# Patient Record
Sex: FEMALE | Race: WHITE
[De-identification: names, ages, dates, MRNs, and addresses within clinical notes are randomized per-mention and may not be internally consistent; named-entity substitution may affect disease eponyms.]

---

## 2019-10-02 ENCOUNTER — HOSPITAL ENCOUNTER (OUTPATIENT)
Dept: HOSPITAL 47 - LABWHC1 | Age: 49
Discharge: HOME | End: 2019-10-02
Attending: FAMILY MEDICINE
Payer: MEDICAID

## 2019-10-02 DIAGNOSIS — I10: Primary | ICD-10-CM

## 2019-10-02 DIAGNOSIS — J45.909: ICD-10-CM

## 2019-10-02 DIAGNOSIS — K58.9: ICD-10-CM

## 2019-10-02 DIAGNOSIS — I51.7: ICD-10-CM

## 2019-10-02 LAB
ALBUMIN SERPL-MCNC: 4.5 G/DL (ref 3.8–4.9)
ALBUMIN/GLOB SERPL: 2.37 G/DL (ref 1.6–3.17)
ALP SERPL-CCNC: 63 U/L (ref 41–126)
ALT SERPL-CCNC: 36 U/L (ref 8–44)
ANION GAP SERPL CALC-SCNC: 9.5 MMOL/L (ref 4–12)
AST SERPL-CCNC: 22 U/L (ref 13–35)
BASOPHILS # BLD AUTO: 0 K/UL (ref 0–0.2)
BASOPHILS NFR BLD AUTO: 1 %
BUN SERPL-SCNC: 13 MG/DL (ref 9–27)
BUN/CREAT SERPL: 18.57 RATIO (ref 12–20)
CALCIUM SPEC-MCNC: 9.3 MG/DL (ref 8.7–10.3)
CHLORIDE SERPL-SCNC: 103 MMOL/L (ref 96–109)
CHOLEST SERPL-MCNC: 191 MG/DL (ref 0–200)
CO2 SERPL-SCNC: 27.5 MMOL/L (ref 21.6–31.8)
EOSINOPHIL # BLD AUTO: 0.1 K/UL (ref 0–0.7)
EOSINOPHIL NFR BLD AUTO: 1 %
ERYTHROCYTE [DISTWIDTH] IN BLOOD BY AUTOMATED COUNT: 4.31 M/UL (ref 3.8–5.4)
ERYTHROCYTE [DISTWIDTH] IN BLOOD: 12.7 % (ref 11.5–15.5)
GLOBULIN SER CALC-MCNC: 1.9 G/DL (ref 1.6–3.3)
GLUCOSE SERPL-MCNC: 119 MG/DL (ref 70–110)
HCT VFR BLD AUTO: 38.8 % (ref 34–46)
HDLC SERPL-MCNC: 49 MG/DL (ref 40–60)
HGB BLD-MCNC: 13 GM/DL (ref 11.4–16)
LDLC SERPL CALC-MCNC: 115 MG/DL (ref 0–131)
LYMPHOCYTES # SPEC AUTO: 1.9 K/UL (ref 1–4.8)
LYMPHOCYTES NFR SPEC AUTO: 23 %
MCH RBC QN AUTO: 30.1 PG (ref 25–35)
MCHC RBC AUTO-ENTMCNC: 33.4 G/DL (ref 31–37)
MCV RBC AUTO: 89.9 FL (ref 80–100)
MONOCYTES # BLD AUTO: 0.3 K/UL (ref 0–1)
MONOCYTES NFR BLD AUTO: 4 %
NEUTROPHILS # BLD AUTO: 5.8 K/UL (ref 1.3–7.7)
NEUTROPHILS NFR BLD AUTO: 70 %
PLATELET # BLD AUTO: 294 K/UL (ref 150–450)
POTASSIUM SERPL-SCNC: 4 MMOL/L (ref 3.5–5.5)
PROT SERPL-MCNC: 6.4 G/DL (ref 6.2–8.2)
SODIUM SERPL-SCNC: 140 MMOL/L (ref 135–145)
T4 FREE SERPL-MCNC: 1 NG/DL (ref 0.8–1.8)
TRIGL SERPL-MCNC: 135 MG/DL (ref 0–149)
VLDLC SERPL CALC-MCNC: 27 MG/DL (ref 5–40)
WBC # BLD AUTO: 8.3 K/UL (ref 3.8–10.6)

## 2019-10-02 PROCEDURE — 85025 COMPLETE CBC W/AUTO DIFF WBC: CPT

## 2019-10-02 PROCEDURE — 84439 ASSAY OF FREE THYROXINE: CPT

## 2019-10-02 PROCEDURE — 80061 LIPID PANEL: CPT

## 2019-10-02 PROCEDURE — 80053 COMPREHEN METABOLIC PANEL: CPT

## 2019-10-02 PROCEDURE — 36415 COLL VENOUS BLD VENIPUNCTURE: CPT

## 2019-10-02 PROCEDURE — 84443 ASSAY THYROID STIM HORMONE: CPT

## 2019-11-20 ENCOUNTER — HOSPITAL ENCOUNTER (OUTPATIENT)
Dept: HOSPITAL 47 - RADMAMWWP | Age: 49
Discharge: HOME | End: 2019-11-20
Attending: FAMILY MEDICINE
Payer: MEDICAID

## 2019-11-20 DIAGNOSIS — Z12.31: Primary | ICD-10-CM

## 2019-11-20 PROCEDURE — 77067 SCR MAMMO BI INCL CAD: CPT

## 2019-11-20 PROCEDURE — 77063 BREAST TOMOSYNTHESIS BI: CPT

## 2020-07-29 ENCOUNTER — HOSPITAL ENCOUNTER (OUTPATIENT)
Dept: HOSPITAL 47 - RADXRMAIN | Age: 50
Discharge: HOME | End: 2020-07-29
Attending: FAMILY MEDICINE
Payer: MEDICAID

## 2020-07-29 DIAGNOSIS — M25.562: Primary | ICD-10-CM

## 2020-09-02 ENCOUNTER — HOSPITAL ENCOUNTER (OUTPATIENT)
Dept: HOSPITAL 47 - RADMRIMAIN | Age: 50
Discharge: HOME | End: 2020-09-02
Attending: ORTHOPAEDIC SURGERY
Payer: MEDICAID

## 2020-09-02 DIAGNOSIS — M17.12: ICD-10-CM

## 2020-09-02 DIAGNOSIS — S83.242A: Primary | ICD-10-CM

## 2020-09-02 NOTE — MR
EXAMINATION TYPE: MR knee LT wo con

 

DATE OF EXAM: 9/2/2020

 

COMPARISON: Radiograph 7/29/2020

 

HISTORY: 49-year-old female M25.562, left knee pain

 

TECHNIQUE: Multiplanar, multisequence imaging of the left knee is performed without IV contrast.

 

FINDINGS:

The ACL and PCL appear intact.

 

Mild edematous change on either side of the intact MCL fibers.

 

Some increased signal and mild edema overlying the femoral attachment of the LCL proper. There is als
o mild localized edema along the deep popliteus muscle fibers. LCL complex otherwise appears intact.

 

Small inner margin radial tear posterior horn medial meniscus, sagittal image 22 and coronal image 24
. Mild superficial cartilage loss along the weightbearing aspect of the medial compartment.

 

Some degenerative signal is seen throughout the lateral meniscus without discrete tear. Overall later
al compartment articular cartilage volume is maintained.

 

Moderate irregular cartilage loss throughout the patellofemoral compartment. There is a shallow troch
lear groove with slight lateral patellar tilt and bulky marginal spurring. Mildly increased TT-TG dis
tance of 2.3 cm.

 

Extensor mechanism appears intact. Small knee joint effusion. No sizable Baker's cyst.

 

Small 1.3 cm ganglion cyst at the origin of the medial head gastrocnemius.

 

Normal popliteal artery anatomy and muscle bulk. No suspicious bone marrow replacement. Patchy red ma
rrow is present and can be seen in the setting of anemia, obesity, smoking, chronic disease.

 

 

IMPRESSION: 

 

1. Correlate for low-grade MCL and proximal LCL proper sprains.

2. Mild strain of the popliteus muscle.

3. Small inner margin radial tear posterior horn medial meniscus. Mild overall medial compartment ost
eoarthrosis.

4. Moderate patellofemoral compartment osteoarthrosis. Suspect underlying patellar tracking disorder 
given increased TT-TG distance (2.3 cm) and trochlear dysplasia (shallow superior trochlear groove).

## 2020-12-01 ENCOUNTER — HOSPITAL ENCOUNTER (OUTPATIENT)
Dept: HOSPITAL 47 - RADUSWWP | Age: 50
Discharge: HOME | End: 2020-12-01
Attending: FAMILY MEDICINE
Payer: MEDICAID

## 2020-12-01 DIAGNOSIS — N93.8: Primary | ICD-10-CM

## 2020-12-01 PROCEDURE — 76830 TRANSVAGINAL US NON-OB: CPT

## 2020-12-02 NOTE — US
EXAMINATION TYPE: US transvaginal

 

DATE OF EXAM: 12/1/2020

 

COMPARISON: Radius exam 8/31/2011

 

CLINICAL HISTORY: N93.8 DUB.

 

TECHNIQUE:  Transvaginal (TV).  

 

Date of LMP:  11/02/20

 

EXAM MEASUREMENTS:

 

Uterus:  11.0 x 8.3 x 9.8 cm

Endometrial Stripe: 1.8 cm

Right Ovary:  not visualized 

Left Ovary:  not visualized

 

Morbidly obese patient, technically difficult study. 

 

1. Uterus:  Anteverted, large fibroid uterus, fibroid measuring 7.0 x 7.8 x  8.2cm

2. Endometrium:  thickened

3. Right Ovary:  Obscured by overlying bowel gas/obesity

4. Left Ovary: Obscured by overlying bowel gas/obesity

5. Bilateral Adnexa:  wnl

6. Posterior cul-de-sac: wnl

 

 

 

IMPRESSION: Fibroid uterus suspected and could be confirmed with pelvic MRI, possible endometrial str
ipe thickening as noted on prior exam although exam is somewhat technically limited

## 2021-03-29 ENCOUNTER — HOSPITAL ENCOUNTER (OUTPATIENT)
Dept: HOSPITAL 47 - RADUSWWP | Age: 51
Discharge: HOME | End: 2021-03-29
Attending: OBSTETRICS & GYNECOLOGY
Payer: MEDICAID

## 2021-03-29 DIAGNOSIS — D25.9: Primary | ICD-10-CM

## 2021-03-29 DIAGNOSIS — N85.4: ICD-10-CM

## 2021-03-29 PROCEDURE — 76856 US EXAM PELVIC COMPLETE: CPT

## 2021-03-29 NOTE — US
EXAMINATION TYPE: US pelvic complete

 

DATE OF EXAM: 3/29/2021

 

COMPARISON: NONE

 

CLINICAL HISTORY: 50-year-old female D25.9 known uterine leiomyoma. Assess measurement of fibroid. Pa
tient with extreme cramping, previous ablation, no cycles.

 

TECHNIQUE:  TA.  Transabdominal sonographic images of the pelvis were acquired.  

 

Date of LMP:  ablation

 

FINDINGS:

 

EXAM MEASUREMENTS:

 

Uterus:  12.8 x 8.4 x 7.4 cm

Endometrial Stripe: Very difficult to delineate. The sonographer estimates the stripe at 7 mm on one 
image.

Right Ovary:  2.2 x 2.3 x 2.1 cm

Left Ovary:  not seen 

 

 

 

1. Uterus:  Retroverted . A fundal fibroid is noted measuring 7.5 x 6.2 x 5.9cm (versus 8.2 x 7.8 x 7
.0 cm on 12/1/2020).

2. Endometrium:  Very difficult to delineate likely due to patient's reported endometrial ablation.

3. Right Ovary:  wnl

4. Left Ovary:  not seen due to bowel gas

5. Bilateral Adnexa:  wnl

6. Posterior cul-de-sac:  wnl

 

 

 

IMPRESSION: 

1. Retroverted uterus. The endometrial stripe is very difficult to delineate, likely due to patient's
 reported endometrial ablation.

2. A fundal fibroid is estimated at 7.5 cm (versus 8.2 cm on 12/1/2020).

3. Unable to visualize the left ovary.

## 2022-08-30 ENCOUNTER — HOSPITAL ENCOUNTER (OUTPATIENT)
Dept: HOSPITAL 47 - RADMRIMAIN | Age: 52
Discharge: HOME | End: 2022-08-30
Attending: ORTHOPAEDIC SURGERY
Payer: MEDICAID
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
## 2022-08-30 DIAGNOSIS — S86.012A: Primary | ICD-10-CM

## 2022-08-30 DIAGNOSIS — R60.0: ICD-10-CM

## 2024-07-02 NOTE — MR
EXAMINATION TYPE: MR ankle LT wo con

 

DATE OF EXAM: 8/30/2022

 

COMPARISON: None

 

HISTORY: Pain medial left ankle, left achilles tendon tear

 

Multiplanar multi echo imaging of the right ankle performed with no contrast.

 

There is small ankle joint effusion. Ankle mortise is anatomic. Joint spaces are fairly normal. There
 is mild Achilles and plantar calcaneal spurring. The plantar fascia appears intact. Achilles tendon 
is intact. The medial and lateral flexor tendons are intact. No evidence of a soft tissue mass. No fo
ifeoma bone destruction. No definite evidence of a tear of the collateral ligaments. There is minimal cruz
bcutaneous edema over the lateral malleolus.

 

IMPRESSION:

Mild lateral subcutaneous edema. Slight increased joint fluid could be some mild synovitis. No fractu
re. No evidence of Achilles tendon tear calcaneal spurring. Initial Post Discharge Follow Up   Discharge Date: 6/30/24  Contact Date: 7/2/2024    Consent Verification:  Assessment Completed With: Patient  HIPAA Verified?  Yes    Discharge Dx:   Sudden onset left facial and left arm heaviness/numbness of unclear etiology     General:   How have you been since your discharge from the hospital? Pt reports she is feeling better. Pt stated the symptoms have resolved. Pt denies chest pain, shortness of breath, dizziness, weakness, bilateral LE swelling, vision changes, confusion, n/v/d, slurred speech, numbness/tingling and pain. Pt lives in [place], she is back home now. Pt is eating and drinking well.   Do you have any pain since discharge?  No  How well was your pain managed while in the hospital?   On a scale of 1-5   1- Very Poor and 5- Very well   Very Well  When you were leaving the hospital were your discharge instructions reviewed with you? Yes  How well were your discharge instructions explained to you?   On a scale of 1-5   1- Very Poor and 5- Very well   Very Well  Do you have any questions about your discharge instructions?  No  Before leaving the hospital was your diagnoses explained to you? Yes  Do you have any questions about your diagnoses? No  Are you able to perform normal daily activities of living as you have prior to your hospital stay (dressing, bathing, ambulating to the bathroom, etc)? yes  (NCM) Was patient given a different diet per AVS? no      Medications: Reviewed medication list with the patient. Medications are up to date.   Current Outpatient Medications   Medication Sig Dispense Refill    aspirin 81 MG Oral Chew Tab Chew 1 tablet (81 mg total) by mouth daily. 30 tablet 0    atorvastatin 20 MG Oral Tab Take 1 tablet (20 mg total) by mouth nightly. 30 tablet 0    Etonogestrel-Ethinyl Estradiol (HALOETTE) 0.12-0.015 MG/24HR Vaginal Ring Place 1 Ring vaginally Every 3 (three) weeks.       Were there any changes to your current medication(s) noted on the  AVS? Yes  If so, were these medication changes discussed with you prior to leaving the hospital? Yes  If a new medication was prescribed:    Was the new medication's purpose & side effects reviewed? Yes  Do you have any questions about your new medication? No  Did you  your discharge medications when you left the hospital? Yes  Let's go over your medications together to make sure we are not missing anything. Medications Reviewed  Are there any reasons that keep you from taking your medication as prescribed? No  Are you having any concerns with constipation? No      Discharge medications reviewed/discussed/and reconciled against outpatient medications with patient.  Any changes or updates to medications sent to PCP.  Patient Acknowledged     Referrals/orders at D/C:  Referrals/orders placed at D/C? no    DME ordered at D/C? No      Discharge orders, AVS reviewed and discussed with patient. Any changes or updates to orders sent to PCP.  Patient Acknowledged        Transportation Needs: No Transportation Needs (7/2/2024)    Transportation Needs     Lack of Transportation: No     Car Seat: Not on file       Financial Resource Strain: Low Risk  (7/2/2024)    Financial Resource Strain     Difficulty of Paying Living Expenses: Not very hard     Med Affordability: No     Follow up appointments:  Reviewed recommended follow up appointments. Pt denies any barriers to keeping/getting to HFU appts.         TCC  Was TCC ordered: Yes  Was TCC scheduled: No, Explain : pt declined TCC as she is back in [place] now.   If yes: []  Advised patient to bring all medications and blood glucose meter/supplies if applicable.    PCP (If no TCC appointment)  Does patient already have a PCP appointment scheduled? No- Pt does not have a PCP but is checking with her insurance who is in network for PCP and Neuro back in [place].     Specialist    Does the patient have any other follow up appointment(s) needing to be scheduled? Yes  If yes: DEVIN  reviewed upcoming specialist appointment with patient: Yes  Does the patient need assistance scheduling appointment(s): No- Pt is aware she is to see Neuro in three weeks. Pt is working on finding a Neurologist back home now.     Is there any reason as to why you cannot make your appointment(s)?  No     Needs post D/C:   Now that you are home, are there any needs or concerns you need addressed before your next visit with your PCP?  (DME, meds, questions, etc.): No    Interventions by NCM:   All d/c instructions reviewed with the pt. Reviewed when to call MD vs when to call 911 or go the ED. Discussed s/s of CVA/TIA. Educated pt on the importance of taking all meds as prescribed as well as close f/u with PCP/specialists. Pt verbalized understanding and will contact the office with any further questions or concerns.       Overall Rating:   How would you rate the care you received while in the hospital? excellent    CCM referral placed:    Not Applicable    BOOK BY DATE: 7/14/24

## 2024-11-28 NOTE — XR
Left knee

 

HISTORY: Pain

 

3 views of the left knee

 

There is tricompartmental marginal spurring. Alignment and joint spaces maintained with exception of 
joint space loss at the patellofemoral joint where there is marked hypertrophic change. No evident vega
int effusion.

 

IMPRESSION: Suspect osteoarthritis, correlate to exclude crystal deposition arthropathy. see chief complaint quote